# Patient Record
Sex: FEMALE | Race: WHITE | NOT HISPANIC OR LATINO | Employment: FULL TIME | URBAN - NONMETROPOLITAN AREA
[De-identification: names, ages, dates, MRNs, and addresses within clinical notes are randomized per-mention and may not be internally consistent; named-entity substitution may affect disease eponyms.]

---

## 2023-09-05 ENCOUNTER — OFFICE VISIT (OUTPATIENT)
Dept: OBSTETRICS AND GYNECOLOGY | Facility: CLINIC | Age: 48
End: 2023-09-05
Payer: COMMERCIAL

## 2023-09-05 VITALS
SYSTOLIC BLOOD PRESSURE: 110 MMHG | TEMPERATURE: 98 F | HEIGHT: 62 IN | WEIGHT: 121.19 LBS | BODY MASS INDEX: 22.3 KG/M2 | DIASTOLIC BLOOD PRESSURE: 70 MMHG

## 2023-09-05 DIAGNOSIS — Z01.419 WELL WOMAN EXAM: Primary | ICD-10-CM

## 2023-09-05 DIAGNOSIS — Z12.4 SCREENING FOR MALIGNANT NEOPLASM OF THE CERVIX: ICD-10-CM

## 2023-09-05 DIAGNOSIS — Z12.31 BREAST CANCER SCREENING BY MAMMOGRAM: ICD-10-CM

## 2023-09-05 DIAGNOSIS — Z97.5 INTRAUTERINE DEVICE: ICD-10-CM

## 2023-09-05 DIAGNOSIS — Z72.89 OTHER PROBLEMS RELATED TO LIFESTYLE: ICD-10-CM

## 2023-09-05 PROCEDURE — 4010F ACE/ARB THERAPY RXD/TAKEN: CPT | Mod: CPTII,,, | Performed by: OBSTETRICS & GYNECOLOGY

## 2023-09-05 PROCEDURE — 1159F PR MEDICATION LIST DOCUMENTED IN MEDICAL RECORD: ICD-10-PCS | Mod: CPTII,,, | Performed by: OBSTETRICS & GYNECOLOGY

## 2023-09-05 PROCEDURE — 3074F PR MOST RECENT SYSTOLIC BLOOD PRESSURE < 130 MM HG: ICD-10-PCS | Mod: CPTII,,, | Performed by: OBSTETRICS & GYNECOLOGY

## 2023-09-05 PROCEDURE — 99386 PR PREVENTIVE VISIT,NEW,40-64: ICD-10-PCS | Mod: ,,, | Performed by: OBSTETRICS & GYNECOLOGY

## 2023-09-05 PROCEDURE — 99386 PREV VISIT NEW AGE 40-64: CPT | Mod: ,,, | Performed by: OBSTETRICS & GYNECOLOGY

## 2023-09-05 PROCEDURE — 3074F SYST BP LT 130 MM HG: CPT | Mod: CPTII,,, | Performed by: OBSTETRICS & GYNECOLOGY

## 2023-09-05 PROCEDURE — 3008F BODY MASS INDEX DOCD: CPT | Mod: CPTII,,, | Performed by: OBSTETRICS & GYNECOLOGY

## 2023-09-05 PROCEDURE — 4010F PR ACE/ARB THEARPY RXD/TAKEN: ICD-10-PCS | Mod: CPTII,,, | Performed by: OBSTETRICS & GYNECOLOGY

## 2023-09-05 PROCEDURE — 3008F PR BODY MASS INDEX (BMI) DOCUMENTED: ICD-10-PCS | Mod: CPTII,,, | Performed by: OBSTETRICS & GYNECOLOGY

## 2023-09-05 PROCEDURE — 3078F PR MOST RECENT DIASTOLIC BLOOD PRESSURE < 80 MM HG: ICD-10-PCS | Mod: CPTII,,, | Performed by: OBSTETRICS & GYNECOLOGY

## 2023-09-05 PROCEDURE — 3078F DIAST BP <80 MM HG: CPT | Mod: CPTII,,, | Performed by: OBSTETRICS & GYNECOLOGY

## 2023-09-05 PROCEDURE — 1159F MED LIST DOCD IN RCRD: CPT | Mod: CPTII,,, | Performed by: OBSTETRICS & GYNECOLOGY

## 2023-09-05 RX ORDER — TRIAMCINOLONE ACETONIDE 1 MG/G
CREAM TOPICAL 2 TIMES DAILY
COMMUNITY
Start: 2023-04-26

## 2023-09-05 RX ORDER — CYCLOBENZAPRINE HCL 10 MG
10 TABLET ORAL
COMMUNITY
Start: 2023-04-26

## 2023-09-05 RX ORDER — MELOXICAM 7.5 MG/1
7.5 TABLET ORAL
COMMUNITY
Start: 2023-08-29

## 2023-09-05 RX ORDER — DIAZEPAM 2 MG/1
2 TABLET ORAL
COMMUNITY
Start: 2023-08-29

## 2023-09-05 RX ORDER — LOSARTAN POTASSIUM 100 MG/1
100 TABLET ORAL
COMMUNITY
Start: 2023-08-29

## 2023-09-05 RX ORDER — DEXTROAMPHETAMINE SACCHARATE, AMPHETAMINE ASPARTATE, DEXTROAMPHETAMINE SULFATE AND AMPHETAMINE SULFATE 5; 5; 5; 5 MG/1; MG/1; MG/1; MG/1
1 TABLET ORAL 2 TIMES DAILY
COMMUNITY
Start: 2023-08-29

## 2023-09-05 NOTE — PROGRESS NOTES
Chief Complaint:   Well Woman     History of Present Illness:  Kacie Mendez is a 47 y.o. year old  here for her annual exam. Currently using IUD for birth control, unsure which one. Patient has no cycles with her IUD. She is without complaints today. PMH Anxiety, controlled with Valium. Denies HI or SI. Follows with Dr. Flores.       LMP:   Frequency: n/a   Cycle Length: n/a days   Flow: n/a  Intermenstrual Bleeding: No  Postcoital Bleeding: No  Dysmenorrhea: No  Sexually Active: yes   Dyspareunia: No  Contraception: IUD   H/o STI: HPV  Last pap:   H/o abnl pap: Yes,   Colposcopy: yes.   Gardasil: n/a   MM, per Pt done in [place], [place]  H/o abnl MMG: yes  Colonoscopy: no hx      Review of Systems:  General/Constitutional: Chills denies. Fatigue/weakness denies. Fever denies. Night sweats denies. Hot flashes denies    Respiratory: Cough denies. Hemoptysis denies. SOB denies. Sputum production denies. Wheezing denies .   Cardiovascular: Chest pain denies. Dizziness denies. Palpitations denies. Swelling in hands/feet denies    Gastrointestinal: Abdominal pain denies. Blood in stool denies. Constipation denies. Diarrhea denies. Heartburn denies. Nausea denies. Vomiting denies    Genitourinary: Incontinence denies. Blood in urine denies. Frequent urination denies. Painful urination denies. Urinary urgency denies. Nocturia denies    Gynecologic: Irregular menses denies. Heavy bleeding denies. Painful menses denies. Vaginal discharge denies. Vaginal odor denies. Vaginal itching denies. Vaginal lesion denies. Pelvic pain denies. Decreased libido denies. Vulvar lesion denies. Prolapse of genital organs denies. Painful intercourse denies. Postcoital bleeding denies  Psychiatric: Depression denies. Anxiety denies.    OB History    Para Term  AB Living   3 2 2 0 1 2   SAB IAB Ectopic Multiple Live Births   1 0 0 0 2      # 1 - Date: , Sex: None, Weight: 2.858 kg (6 lb 4.8 oz), GA:  None, Delivery: Vaginal, Spontaneous, Apgar1: None, Apgar5: None, Living: Living, Birth Comments: None    # 2 - Date: , Sex: None, Weight: None, GA: None, Delivery: None, Apgar1: None, Apgar5: None, Living: Fetal Demise, Birth Comments: None    # 3 - Date: , Sex: None, Weight: None, GA: None, Delivery: , Low Transverse, Apgar1: None, Apgar5: None, Living: Living, Birth Comments: None      Past Medical History:   Diagnosis Date    Abnormal Pap smear of cervix     Anxiety disorder, unspecified     Hypertension        Current Outpatient Medications:     cyclobenzaprine (FLEXERIL) 10 MG tablet, Take 10 mg by mouth., Disp: , Rfl:     dextroamphetamine-amphetamine (ADDERALL) 20 mg tablet, Take 1 tablet by mouth 2 (two) times daily., Disp: , Rfl:     diazePAM (VALIUM) 2 MG tablet, Take 2 mg by mouth., Disp: , Rfl:     losartan (COZAAR) 100 MG tablet, Take 100 mg by mouth., Disp: , Rfl:     meloxicam (MOBIC) 7.5 MG tablet, Take 7.5 mg by mouth., Disp: , Rfl:     triamcinolone acetonide 0.1% (KENALOG) 0.1 % cream, Apply topically 2 (two) times daily., Disp: , Rfl:     Review of patient's allergies indicates:  No Known Allergies    Past Surgical History:   Procedure Laterality Date     SECTION      KIDNEY STONE SURGERY  2009    right hand       Family History   Problem Relation Age of Onset    Colon cancer Neg Hx     Ovarian cancer Neg Hx     Uterine cancer Neg Hx     Cervical cancer Neg Hx      Social History     Socioeconomic History    Marital status:    Tobacco Use    Smoking status: Former     Current packs/day: 0.00     Types: Cigarettes     Quit date:      Years since quittin.6    Smokeless tobacco: Never   Substance and Sexual Activity    Alcohol use: Not Currently    Drug use: Not Currently     Types: Marijuana    Sexual activity: Not Currently     Partners: Male     Birth control/protection: I.U.D.       Physical Exam:  /70 (BP Location: Left arm, Patient  "Position: Sitting, BP Method: Medium (Manual))   Temp 98.1 °F (36.7 °C) (Temporal)   Ht 5' 2" (1.575 m)   Wt 55 kg (121 lb 3.2 oz)   BMI 22.17 kg/m²     Chaperone: present.       General appearance: healthy, well-nourished and well-developed     Psychiatric: Orientation to time, place and person. Normal mood and affect and active, alert     Skin: Appearance: no rashes or lesions.     Neck:   Neck: supple, FROM, trachea midline. and no masses   Thyroid: no enlargement or nodules and non-tender.       Cardiovascular:   Auscultation: RRR and no murmur.   Peripheral Vascular: no varicosities, LLE edema, RLE edema, calf tenderness, and palpable cord and pedal pulses intact.     Lungs:   Respiratory effort: no intercostal retractions or accessory muscle usage.   Auscultation: no wheezing, rales/crackles, or rhonchi and clear to auscultation.     Breast:   Inspection/Palpation: no tenderness, discrete/distinct masses, skin changes, or abnormal secretions. Nipple appearance normal.     Abdomen:   Auscultation/Inspection/Palpation: no hepatomegaly, splenomegaly, masses, tenderness or CVA tenderness and soft, non-distended bowel sounds preset.    Hernia: no palpable hernias.     Female Genitalia:    Vulva: no masses, tenderness or lesions    Bladder/Urethra: no urethral discharge or mass, normal meatus, bladder non-distended.    Vagina: no tenderness, erythema, cystocele, rectocele, abnormal vaginal discharge or vesicle(s) or ulcers    Cervix: no discharge, no cervical lacerations noted or motion tenderness and grossly normal IUD strings in place   Uterus: normal size and shape and midline, non-tender, and no uterine prolapse.    Adnexa/Parametria: no parametrial tenderness or mass, no adnexal tenderness or ovarian mass.     Lymph Nodes:   Palpation: non tender submandibular nodes, axillary nodes, or inguinal nodes.     Rectal Exam:   Rectum: normal perianal skin.       Assessment/Plan:  1. Well woman exam  Pap   Advised " patient if she notices any changes to her breasts, including a lump, mass, dimpling, discharge, rash or tenderness, to should contact us immediately  Healthy diet, exercise   MMG  Multivitamin   Seat belt   Sunscreen use   Safe sex/ STI education  Contraception: IUD  Annual labs: w/ PCP Dr. Flores  C-scope: no history, edcuated, fu with Dr. Flores    2. Intrauterine device  Normal exam; strings noted on cervix  Educated on bleeding patterns with IUD  Gave bleeding precautions  Discussed with patient that birth control does not protect against STIs  Safe sex education  RONALD for which IUD was placed and when     3. Breast cancer screening by mammogram  Discussed recommendations of annual screening after age of 40 with mammogram and MRI for patients with lifetime risk greater than 25%       Explained that screening is not 100% reliable. Advised patient if she notices any changes to her breast including a lump, mass, dimpling, discharge, rash, or tenderness she should contact us immediately.       Recommend BSE monthly

## 2023-09-11 LAB — PSYCHE PATHOLOGY RESULT: NORMAL

## 2023-10-09 ENCOUNTER — HOSPITAL ENCOUNTER (OUTPATIENT)
Dept: RADIOLOGY | Facility: HOSPITAL | Age: 48
Discharge: HOME | End: 2023-10-09
Attending: OBSTETRICS & GYNECOLOGY
Payer: COMMERCIAL

## 2023-10-09 DIAGNOSIS — Z12.31 BREAST CANCER SCREENING BY MAMMOGRAM: ICD-10-CM

## 2023-10-09 PROCEDURE — 77067 SCR MAMMO BI INCL CAD: CPT | Mod: TC

## 2023-11-01 ENCOUNTER — TELEPHONE (OUTPATIENT)
Dept: OBSTETRICS AND GYNECOLOGY | Facility: CLINIC | Age: 48
End: 2023-11-01
Payer: COMMERCIAL

## 2023-11-01 NOTE — TELEPHONE ENCOUNTER
Contacted pt to follow up on recent MMG and to make sure she had additional imaging scheduled. Pt confirmed appt on 11/28/23. I then told pt Dr Lopez would like to see her 1-2 weeks after testing to go over those results, pt states its not a good time to schedule she will call us back.

## 2023-11-01 NOTE — TELEPHONE ENCOUNTER
----- Message from Danna Lopez MD sent at 11/1/2023  1:34 PM CDT -----  Call educate pt may have imaging and then come in or come in and discuss her options  ----- Message -----  From: Andrez, Rad Results In  Sent: 11/1/2023   7:51 AM CDT  To: Danna Lopez MD

## 2023-11-02 NOTE — TELEPHONE ENCOUNTER
Attempted to contact patient to schedule F/U with Dr. Lopez after imaging done. No answer. Left voicemail.

## 2023-11-03 NOTE — TELEPHONE ENCOUNTER
Patient has a Bilateral Diagnostic MMG and Bilateral Breast Ultrasound scheduled at [facility] for 11/28/23 @ 2:45 pm.  Dr. Lopez desires an appointment for follow up on these images after 11/28/23.  Attempted to call patient today and 2 other days with no answer.  [employer] message also sent to patient requesting the follow up appointment.  Will have Front Staff attempt to reach patient for this appointment.

## 2024-01-10 ENCOUNTER — HOSPITAL ENCOUNTER (OUTPATIENT)
Dept: RADIOLOGY | Facility: HOSPITAL | Age: 49
Discharge: HOME | End: 2024-01-10
Attending: OBSTETRICS & GYNECOLOGY
Payer: COMMERCIAL

## 2024-01-10 DIAGNOSIS — R92.8 ABNORMAL MAMMOGRAM: ICD-10-CM

## 2024-01-10 PROCEDURE — 77066 DX MAMMO INCL CAD BI: CPT | Mod: TC

## 2024-01-10 PROCEDURE — 77062 BREAST TOMOSYNTHESIS BI: CPT | Mod: 26,,, | Performed by: STUDENT IN AN ORGANIZED HEALTH CARE EDUCATION/TRAINING PROGRAM

## 2024-01-10 PROCEDURE — 76642 ULTRASOUND BREAST LIMITED: CPT | Mod: 26,,, | Performed by: STUDENT IN AN ORGANIZED HEALTH CARE EDUCATION/TRAINING PROGRAM

## 2024-01-10 PROCEDURE — 76642 ULTRASOUND BREAST LIMITED: CPT | Mod: TC,50

## 2024-01-10 PROCEDURE — 77066 DX MAMMO INCL CAD BI: CPT | Mod: 26,,, | Performed by: STUDENT IN AN ORGANIZED HEALTH CARE EDUCATION/TRAINING PROGRAM

## 2025-04-02 PROBLEM — M25.641 STIFFNESS OF RIGHT HAND JOINT: Status: ACTIVE | Noted: 2022-09-03

## 2025-04-02 PROBLEM — G43.109 MIGRAINE WITH AURA: Status: ACTIVE | Noted: 2022-07-06

## 2025-04-02 PROBLEM — R29.898 WEAKNESS OF RIGHT HAND: Status: ACTIVE | Noted: 2022-09-03

## 2025-04-02 PROBLEM — L30.9 ECZEMA: Status: ACTIVE | Noted: 2022-07-06

## 2025-04-02 PROBLEM — F32.A DEPRESSIVE DISORDER: Status: ACTIVE | Noted: 2025-04-02

## 2025-04-02 PROBLEM — F50.9 EATING DISORDER: Status: ACTIVE | Noted: 2025-04-02

## 2025-04-02 PROBLEM — F41.1 ANXIETY STATE: Status: ACTIVE | Noted: 2022-07-06

## 2025-04-02 PROBLEM — I10 ESSENTIAL HYPERTENSION: Status: ACTIVE | Noted: 2022-07-06

## 2025-04-02 PROBLEM — K21.9 GASTROESOPHAGEAL REFLUX DISEASE WITHOUT ESOPHAGITIS: Status: ACTIVE | Noted: 2022-07-06

## 2025-04-02 PROBLEM — Z87.442 HISTORY OF RENAL CALCULI: Status: ACTIVE | Noted: 2019-03-25

## 2025-04-02 PROBLEM — S62.329A FRACTURE OF SHAFT OF METACARPAL BONE: Status: ACTIVE | Noted: 2022-09-03

## 2025-04-02 PROBLEM — M79.641 PAIN IN RIGHT HAND: Status: ACTIVE | Noted: 2022-09-03

## 2025-04-02 RX ORDER — LEVONORGESTREL 52 MG/1
INTRAUTERINE DEVICE INTRAUTERINE
COMMUNITY

## 2025-04-02 RX ORDER — FLUOXETINE 10 MG/1
1 CAPSULE ORAL DAILY
COMMUNITY
End: 2025-04-03

## 2025-04-02 RX ORDER — SUMATRIPTAN SUCCINATE 100 MG/1
TABLET ORAL
COMMUNITY
End: 2025-04-03 | Stop reason: SDUPTHER

## 2025-04-02 NOTE — PROGRESS NOTES
CC: WELL WOMAN (age 45-59)-perimenopausal  Patient Care Team:  Jaydon Flores MD as PCP - General (Internal Medicine)  Danna Lopez MD as Consulting Physician (Obstetrics and Gynecology)    NEW PATIENT    HPI:  Patient is a 49 y.o. who presents for her well woman exam today.  History reviewed with patient. No cycles and has had several mirenas over the years. Discussed menopause symptoms and feels fine now but aware if she wants t o check that an fsh could be ordered. Needs rf on her imitrex but will continue that in the future with her pcp  Patient is without complaints or concerns today.     CONTRACEPTION: IUD-Mirena  HX ABNL PAPS: in past-no excisional tx    REVIEW OF PRIOR DATA/ HEALTH MAINTENANCE:  LAST ANNUAL:   2023 LAST PAP:  2023              LAST MMG- 2023-  [facility]    LAST COLONOSCOPY- never -wants ref to GI        Past Medical History:   Diagnosis Date    Abnormal Pap smear of cervix     Anxiety disorder, unspecified     Hypertension      SURGICAL HX:   has a past surgical history that includes  section (); Kidney stone surgery (); and right hand ().    SOCIAL HX:    reports that she quit smoking about 29 years ago. Her smoking use included cigarettes. She has never used smokeless tobacco. She reports that she does not currently use alcohol. She reports that she does not currently use drugs after having used the following drugs: Marijuana.    Current Outpatient Medications   Medication Instructions    diazePAM (VALIUM) 2 mg    levonorgestreL (MIRENA) 52 mg IUD Take 1 device by intrauterine route.    lisdexamfetamine (VYVANSE) 60 mg, Every morning    lisdexamfetamine (VYVANSE) 60 mg, Every morning    meloxicam (MOBIC) 7.5 mg    sumatriptan (IMITREX) 100 mg, Oral, Once     VITALS:  Blood pressure (!) 137/95, weight 51.8 kg (114 lb 3.2 oz).  Body mass index is 20.89 kg/m².     PHYSICAL EXAM-  APPEARANCE: Well appearing, in no acute distress.  CV/  PULM: no resp distress, normal resp effort  PSYCH: Normal mood and affect, cooperative  SKIN: No rashes, lesions, or abnormal bruising  ABD: Soft, no masses, tenderness, or distension  BREASTS: No skin changes,nipple dc. No palpable masses or tenderness  PELVIC:  VULVA: Normal female genitalia. No lesions  BLADDER: No suprapubic tenderness  VAGINA/ CVX:  No lesions, no d/c  UTERUS: mobile, non-tender  ADNEXA: No masses, tenderness, or fullness.  ANUS/ PERINEUM: Normal tone.  No lesions.       -IUD string seen    *patient verbally consented for exam and female chaperone present for entire exam    ASSESSMENT and PLAN:  Encounter for well woman exam with routine gynecological exam  -     Liquid-based pap smear, screening    Breast cancer screening by mammogram  -     Mammo Digital Screening Bilat w/ Sebastian (XPD); Future; Expected date: 04/16/2025    Migraine without status migrainosus, not intractable, unspecified migraine type  -     Discontinue: sumatriptan (IMITREX) 100 MG tablet; Take 1 tablet (100 mg total) by mouth once. for 1 dose  Dispense: 12 tablet; Refill: 3  -     sumatriptan (IMITREX) 100 MG tablet; Take 1 tablet (100 mg total) by mouth once. for 1 dose  Dispense: 12 tablet; Refill: 3    Screening for colon cancer  -     Ambulatory referral/consult to Gastroenterology; Future; Expected date: 04/10/2025       FOLLOWUP:  1 year for wwe or sooner prn    COUNSELING:  Patient was counseled today on recommendation for yearly pelvic exam, current Pap guidelines, self breast exams, contraception, recommendations for annual screening mammograms, and routine screening colonoscopy at age 45.  Encouraged patient to see her PCP for other health maintenance.

## 2025-04-03 ENCOUNTER — OFFICE VISIT (OUTPATIENT)
Dept: OBSTETRICS AND GYNECOLOGY | Facility: CLINIC | Age: 50
End: 2025-04-03
Payer: COMMERCIAL

## 2025-04-03 VITALS
WEIGHT: 114.19 LBS | DIASTOLIC BLOOD PRESSURE: 95 MMHG | SYSTOLIC BLOOD PRESSURE: 137 MMHG | BODY MASS INDEX: 20.89 KG/M2

## 2025-04-03 DIAGNOSIS — Z12.31 BREAST CANCER SCREENING BY MAMMOGRAM: ICD-10-CM

## 2025-04-03 DIAGNOSIS — G43.909 MIGRAINE WITHOUT STATUS MIGRAINOSUS, NOT INTRACTABLE, UNSPECIFIED MIGRAINE TYPE: ICD-10-CM

## 2025-04-03 DIAGNOSIS — Z12.11 SCREENING FOR COLON CANCER: ICD-10-CM

## 2025-04-03 DIAGNOSIS — Z01.419 ENCOUNTER FOR WELL WOMAN EXAM WITH ROUTINE GYNECOLOGICAL EXAM: Primary | ICD-10-CM

## 2025-04-03 RX ORDER — SUMATRIPTAN SUCCINATE 100 MG/1
100 TABLET ORAL ONCE
Qty: 12 TABLET | Refills: 3 | Status: SHIPPED | OUTPATIENT
Start: 2025-04-03 | End: 2025-04-03

## 2025-04-03 RX ORDER — CLOTRIMAZOLE AND BETAMETHASONE DIPROPIONATE 10; .64 MG/G; MG/G
CREAM TOPICAL
COMMUNITY
Start: 2024-10-30 | End: 2025-04-03

## 2025-04-03 RX ORDER — LISDEXAMFETAMINE DIMESYLATE 60 MG/1
60 CAPSULE ORAL EVERY MORNING
COMMUNITY

## 2025-04-03 RX ORDER — LISDEXAMFETAMINE DIMESYLATE 60 MG/1
60 CAPSULE ORAL EVERY MORNING
COMMUNITY
Start: 2025-03-20

## 2025-04-10 ENCOUNTER — RESULTS FOLLOW-UP (OUTPATIENT)
Dept: OBSTETRICS AND GYNECOLOGY | Facility: CLINIC | Age: 50
End: 2025-04-10

## 2025-04-10 NOTE — PROGRESS NOTES
Please call patient and let her know that her pap came back ASCUS + HPV and we need to schedule her for colpo in the near future.          If she has not had/completed her Gardasil vaccines, that is recommended.  Current evidence suggests that it may boost her antibody response to this strain and will protect her from the other strains in the future.           If she is a smoker or vapes advise her that smoking is strong risk factor for cervical dysplasia and offer referral to smoking cessation clinic

## 2025-04-16 RX ORDER — DIAZEPAM 5 MG/1
5 TABLET ORAL
COMMUNITY

## 2025-04-16 RX ORDER — LISDEXAMFETAMINE DIMESYLATE 70 MG/1
70 CAPSULE ORAL EVERY MORNING
COMMUNITY
Start: 2024-11-27

## 2025-04-22 ENCOUNTER — PROCEDURE VISIT (OUTPATIENT)
Dept: OBSTETRICS AND GYNECOLOGY | Facility: CLINIC | Age: 50
End: 2025-04-22
Payer: COMMERCIAL

## 2025-04-22 VITALS
DIASTOLIC BLOOD PRESSURE: 99 MMHG | WEIGHT: 113.38 LBS | SYSTOLIC BLOOD PRESSURE: 160 MMHG | BODY MASS INDEX: 20.74 KG/M2

## 2025-04-22 DIAGNOSIS — Z01.818 PRE-PROCEDURAL EXAMINATION: Primary | ICD-10-CM

## 2025-04-22 DIAGNOSIS — R87.810 ASCUS WITH POSITIVE HIGH RISK HPV CERVICAL: ICD-10-CM

## 2025-04-22 DIAGNOSIS — R87.610 ASCUS WITH POSITIVE HIGH RISK HPV CERVICAL: ICD-10-CM

## 2025-04-22 LAB
B-HCG UR QL: NEGATIVE
CTP QC/QA: YES

## 2025-04-22 PROCEDURE — 57456 ENDOCERV CURETTAGE W/SCOPE: CPT | Mod: S$PBB,,, | Performed by: OBSTETRICS & GYNECOLOGY

## 2025-04-22 RX ORDER — ESCITALOPRAM OXALATE 10 MG/1
10 TABLET ORAL
COMMUNITY
Start: 2025-04-16

## 2025-04-22 NOTE — PROCEDURES
Colposcopy    Date/Time: 2025 3:45 PM    Performed by: Tejal Cardenas MD  Authorized by: Tejal Cardenas MD    Consent obatined:  Prior to procedure the appropriate consent was completed and verified  Timeout:Immediately prior to procedure a time out was called to verify the correct patient, procedure, equipment, support staff and site/side marked as required  Assistants?: Yes    List of assistants:  Jhony villegas lpn   I was present for the entire procedure.    Colposcopy Site:  Cervix  Acrowhite Lesion: No    Atypical Vessels: No    Transformation Zone Adequate?: Yes    Biopsy?: No    ECC Performed?: Yes    Estimated blood loss (cc):  0   Patient tolerated the procedure well with no immediate complications.   Patient was discharged and will follow up if any complications occur     Pt is a 48yo  that has a remote hx of atypical paps yrs ago- had colpo once but resolved on its own. Former smoker, no gardasil and not wanting to pay oop at this point for it.

## 2025-04-25 ENCOUNTER — RESULTS FOLLOW-UP (OUTPATIENT)
Dept: OBSTETRICS AND GYNECOLOGY | Facility: CLINIC | Age: 50
End: 2025-04-25

## 2025-04-25 NOTE — PROGRESS NOTES
Please let pt know her ECC was benign.    Therefore we will repeat her pap in 6mos. Please assist her in scheduling that

## 2025-04-28 ENCOUNTER — TELEPHONE (OUTPATIENT)
Dept: OBSTETRICS AND GYNECOLOGY | Facility: CLINIC | Age: 50
End: 2025-04-28
Payer: COMMERCIAL

## 2025-04-28 NOTE — TELEPHONE ENCOUNTER
----- Message from Tejal Cardenas MD sent at 4/25/2025  3:07 PM CDT -----  Please let pt know her ECC was benign.    Therefore we will repeat her pap in 6mos. Please assist her in scheduling that  ----- Message -----  From: Jhony Russ LPN  Sent: 4/22/2025   4:16 PM CDT  To: Tejal Cardenas MD

## 2025-04-28 NOTE — TELEPHONE ENCOUNTER
Spoke with patient. Two pt identifiers confirmed. Notified patient of her ECC results. Patient verbalized understanding.

## 2025-06-03 ENCOUNTER — TELEPHONE (OUTPATIENT)
Dept: OBSTETRICS AND GYNECOLOGY | Facility: CLINIC | Age: 50
End: 2025-06-03
Payer: COMMERCIAL

## 2025-06-03 ENCOUNTER — TELEPHONE (OUTPATIENT)
Dept: GASTROENTEROLOGY | Facility: CLINIC | Age: 50
End: 2025-06-03
Payer: COMMERCIAL

## 2025-06-03 NOTE — TELEPHONE ENCOUNTER
Copied from CRM #[id number]. Topic: Appointments - Appointment Access  >> Alexsander 3, 2025  8:44 AM Ree wrote:  Type:  Needs Medical Advice    Who Called: Kacie  She ask about schedule the colonoscopy with the order from   Would the patient rather a call back or a response via [facility]? Call back  Best Call Back Number: [phone]  Thanks!

## 2025-06-03 NOTE — TELEPHONE ENCOUNTER
Patient wanting to schedule, I see referral from obgyn but nothing saying it's approved and ready for scheduling.